# Patient Record
Sex: FEMALE | Race: WHITE
[De-identification: names, ages, dates, MRNs, and addresses within clinical notes are randomized per-mention and may not be internally consistent; named-entity substitution may affect disease eponyms.]

---

## 2018-02-13 ENCOUNTER — HOSPITAL ENCOUNTER (EMERGENCY)
Dept: HOSPITAL 80 - FED | Age: 28
Discharge: HOME | End: 2018-02-13
Payer: SELF-PAY

## 2018-02-13 VITALS
HEART RATE: 74 BPM | SYSTOLIC BLOOD PRESSURE: 122 MMHG | DIASTOLIC BLOOD PRESSURE: 69 MMHG | OXYGEN SATURATION: 98 % | RESPIRATION RATE: 18 BRPM | TEMPERATURE: 98.1 F

## 2018-02-13 DIAGNOSIS — R53.83: ICD-10-CM

## 2018-02-13 DIAGNOSIS — R42: Primary | ICD-10-CM

## 2018-02-13 NOTE — EDPHY
H & P


Time Seen by Provider: 02/13/18 16:03


Constitutional: 


 Initial Vital Signs











Temperature (C)  37.0 C   02/13/18 16:05


 


Heart Rate  72   02/13/18 16:05


 


Respiratory Rate  16   02/13/18 16:05


 


Blood Pressure  126/73 H  02/13/18 16:05


 


O2 Sat (%)  97   02/13/18 16:05








 











O2 Delivery Mode               Room Air














Allergies/Adverse Reactions: 


 





No Known Allergies Allergy (Unverified 02/13/18 16:04)


 








Home Medications: 














 Medication  Instructions  Recorded


 


NK [No Known Home Meds]  02/13/18














Medical Decision Making


ED Course/Re-evaluation: 





CHIEF COMPLAINT:  "I think I need some oxygen and fluid and nutrients and 

vitamins. I don't want any medicine." Dizziness, fatigue





HISTORY OF PRESENT ILLNESS:  


The patient is a 26 y/o female who is complaining of dizziness and fatigue 

[place] She attributes her 

symptoms to altitude sickness and is requesting oxygen. She is declining any 

"medicine treatments" and only wants oxygen and IV fluids from us. She notes 

she eats a "raw vegan" diet and has been eating more fruit and vegetables to 

treat her symptoms without improvement. She denies fever, myalgias, abdominal 

pain, vomiting, diarrhea, cough, rhinorrhea, headache, unilateral weakness or 

paresthesias, and does not think she is ill or could be ill. She declines any 

lab work or investigation of her symptoms. She reports she is normally healthy. 





REVIEW OF SYSTEMS:  





A 10 point review of systems was performed and is negative with the exception 

of the elements mentioned in the history of present illness.





PHYSICAL EXAM:  





HR, BP, O2 Sat, RR.  Temp noted


General Appearance:  Alert, well hydrated, appropriate, and non-toxic appearing.


Head:  Atraumatic without scalp tenderness or obvious injury


Eyes:  Pupils equal, round, reactive to light and accommodation, EOMI, no trauma

, no injection.


Nose:  Atraumatic, no rhinorrhea, clear.


Throat:  Mucus membranes moist.


Neck:  Supple


Respiratory:  No retractions, no distress, no wheezes, and no accessory muscle 

use.  Lungs are clear to auscultation bilaterally.


Cardiovascular:  Regular rate and rhythm, no murmurs, rubs, or gallops. Good 

capillary refill all extremities.


Gastrointestinal:  Abdomen is soft, nontender, non-distended, no masses, no 

rebound, no guarding, no peritoneal signs.


Musculoskeletal:  Normal active ROM of all extremities, atraumatic.


Neurological:  Alert, appropriate, and interactive.  The patient has non-focal 

cranial nerves, motor, sensory, and cerebellar exam.


Skin:  No rashes, good turgor, no nodules on palpation.





Past medical history: Denies


Past surgical history: Denies


Family history: Noncontributory


Social history: Eats "raw vegan" diet, [place].





DIFFERENTIAL DIAGNOSIS:   The differential diagnosis for the patient's symptoms 

included but was not limited to peripheral and central causes of vertigo, 

orthostatic causes including dehydration, cardiogenic and neurogenic causes, 

blood loss, pneumonia, urinary tract infection, viral syndrome, meningitis, and 

sepsis.





MEDICAL DECISION MAKING:  





This is a vegan 26 y/o female who presents with a 2-week history of fatigue, 

nausea, and dizziness [place] She is specifically 

requesting oxygen and IV fluids and adamantly declining any further 

investigation or treatment for her symptoms. She refused lab work, nebulizer. I'

ve ordered IV fluids and 





Departure





- Departure


Disposition: Home, Routine, Self-Care


Clinical Impression: 


 Dizziness





Fatigue


Qualifiers:


 Fatigue type: other Qualified Code(s): R53.83 - Other fatigue





Condition: Good


Instructions:  Fatigue (ED)


Additional Instructions: 


Follow up with your primary care provider for unimproved symptoms over the next 

week. Return to the ED for worsening of condition.


Report Scribed for: Willie Turner


Report Scribed by: Daniela Peterson


Date of Report: 02/13/18


Time of Report: 16:20

## 2018-02-17 ENCOUNTER — HOSPITAL ENCOUNTER (EMERGENCY)
Dept: HOSPITAL 80 - FED | Age: 28
Discharge: HOME | End: 2018-02-17
Payer: SELF-PAY

## 2018-02-17 VITALS — SYSTOLIC BLOOD PRESSURE: 112 MMHG | DIASTOLIC BLOOD PRESSURE: 72 MMHG | HEART RATE: 64 BPM | RESPIRATION RATE: 18 BRPM

## 2018-02-17 VITALS — TEMPERATURE: 98.2 F | OXYGEN SATURATION: 97 %

## 2018-02-17 DIAGNOSIS — R19.5: Primary | ICD-10-CM

## 2018-02-17 DIAGNOSIS — E86.9: ICD-10-CM

## 2018-02-17 LAB
INR PPP: 0.92 (ref 0.83–1.16)
PLATELET # BLD: 224 10^3/UL (ref 150–400)
PROTHROMBIN TIME: 12.6 SEC (ref 12–15)

## 2018-02-17 NOTE — EDPHY
HPI/HX/ROS/PE/MDM


Narrative: 





CHIEF COMPLAINT: Blood in stool 





HPI: 





This patient is a healthy 27 year old female complaining of blood in her stool. 

This began two days ago. Today, she had two bowel movements that were darker 

than usual with associated blood. She denies any pain with bowel movements, 

loose stools, or tarry stools. She has not been able to exercise as strenuously 

as usual and has felt fatigued and dehydrated but originally attributed this to 

altitude sickness as she recently moved to [place] from [place]. She was 

evaluated 2/13/18 for possible altitude sickness and requested and received IVF

, but denied any laboratory tests or medical interventions at that time. The 

patient reports she has been taking Mucuna pruriens, a herbal supplement to 

increase dopamine levels, and is concerned that too much of this is causing her 

symptoms. She additionally complains of frequent blurred vision, confusion, and 

nausea.  No fever, vomiting, urinary complaints, or other associated symptoms. 





REVIEW OF SYSTEMS:


Aside from elements discussed in the HPI, a comprehensive 10-point review of 

systems was reviewed and is negative.





PMH: Denies.





SOCIAL HISTORY: Moved to [place] from [place] three weeks ago. 





PHYSICAL EXAM:


General:Patient is alert, in no acute distress.


ENT:Eyes are normal to inspection.  ENT inspection normal.


Neck: Normal inspection.  Full range of motion.


Respiratory:No respiratory distress.  Breath sounds normal bilaterally.


Cardiovascular: Regular rate and rhythm.  Strong peripheral pulses.  Normal cap 

refill.


Abdomen:The abdomen is nontender to palpation. There are no peritoneal signs. 

There are normal bowel sounds.


Back: Normal to inspection.  No tenderness to palpation.


Skin: Normal color.  No rash.  Warm and dry.


Extremities: Normal appearance.  Full range of motion.


Neuro: Oriented x3.  Normal motor function.  Normal sensory function.





ED Course: 





28 y/o female presents with blood in her stool beginning two days ago. Exam 

unremarkable. Plan for labs including CBC, chemistries, feces occult blood test.





The patient takes Mucuna pruriens, a Dopamine herbal supplement. She takes 3-6 

pills per day, 250mg stem and 350mg of leaf per pill, and is concerned this may 

be causing symptoms. Internet search for Mucuna pruriens did not find any 

toxicology warnings. 





21:32 Pt unable to provide stool sample. Laboratory studies within normal limits

, and the patient continues to be well-appearing. I offered to have her stay 

until she can provide a sample, but the patient works as a  for [employer] and 

is anxious to leave and return to work. She is comfortable with discharge home 

at this time. Will discharge in good condition with GI referral. Return 

precautions discussed.  





- Data Points


Laboratory Results: 


 Laboratory Results





 02/17/18 20:20 





 02/17/18 20:20 





 











  02/17/18 02/17/18 02/17/18





  20:25 20:20 20:20


 


WBC      





    


 


RBC      





    


 


Hgb      





    


 


Hct      





    


 


MCV      





    


 


MCH      





    


 


MCHC      





    


 


RDW      





    


 


Plt Count      





    


 


MPV      





    


 


Neut % (Auto)      





    


 


Lymph % (Auto)      





    


 


Mono % (Auto)      





    


 


Eos % (Auto)      





    


 


Baso % (Auto)      





    


 


Nucleat RBC Rel Count      





    


 


Absolute Neuts (auto)      





    


 


Absolute Lymphs (auto)      





    


 


Absolute Monos (auto)      





    


 


Absolute Eos (auto)      





    


 


Absolute Basos (auto)      





    


 


Absolute Nucleated RBC      





    


 


Immature Gran %      





    


 


Immature Gran #      





    


 


PT  12.6 SEC SEC    





   (12.0-15.0)   


 


INR  0.92     





   (0.83-1.16)   


 


APTT  28.1 SEC SEC    





   (23.0-38.0)   


 


Sodium      141 mEq/L mEq/L





     (135-145) 


 


Potassium      4.6 mEq/L mEq/L





     (3.5-5.2) 


 


Chloride      105 mEq/L mEq/L





     () 


 


Carbon Dioxide      24 mEq/l mEq/l





     (22-31) 


 


Anion Gap      12 mEq/L mEq/L





     (8-16) 


 


BUN      20 mg/dL mg/dL





     (7-23) 


 


Creatinine      0.6 mg/dL mg/dL





     (0.6-1.0) 


 


Estimated GFR      > 60 





    


 


Glucose      90 mg/dL mg/dL





     () 


 


Calcium      9.8 mg/dL mg/dL





     (8.5-10.4) 


 


Beta HCG, Qual    NEGATIVE   





    














  02/17/18





  20:20


 


WBC  10.14 10^3/uL H 10^3/uL





   (3.80-9.50) 


 


RBC  4.08 10^6/uL L 10^6/uL





   (4.18-5.33) 


 


Hgb  12.4 g/dL L g/dL





   (12.6-16.3) 


 


Hct  37.3 % L %





   (38.0-47.0) 


 


MCV  91.4 fL fL





   (81.5-99.8) 


 


MCH  30.4 pg pg





   (27.9-34.1) 


 


MCHC  33.2 g/dL g/dL





   (32.4-36.7) 


 


RDW  13.1 % %





   (11.5-15.2) 


 


Plt Count  224 10^3/uL 10^3/uL





   (150-400) 


 


MPV  11.3 fL fL





   (8.7-11.7) 


 


Neut % (Auto)  60.3 % %





   (39.3-74.2) 


 


Lymph % (Auto)  26.0 % %





   (15.0-45.0) 


 


Mono % (Auto)  11.8 % %





   (4.5-13.0) 


 


Eos % (Auto)  1.0 % %





   (0.6-7.6) 


 


Baso % (Auto)  0.7 % %





   (0.3-1.7) 


 


Nucleat RBC Rel Count  0.0 % %





   (0.0-0.2) 


 


Absolute Neuts (auto)  6.11 10^3/uL 10^3/uL





   (1.70-6.50) 


 


Absolute Lymphs (auto)  2.64 10^3/uL 10^3/uL





   (1.00-3.00) 


 


Absolute Monos (auto)  1.20 10^3/uL H 10^3/uL





   (0.30-0.80) 


 


Absolute Eos (auto)  0.10 10^3/uL 10^3/uL





   (0.03-0.40) 


 


Absolute Basos (auto)  0.07 10^3/uL 10^3/uL





   (0.02-0.10) 


 


Absolute Nucleated RBC  0.00 10^3/uL 10^3/uL





   (0-0.01) 


 


Immature Gran %  0.2 % %





   (0.0-1.1) 


 


Immature Gran #  0.02 10^3/uL 10^3/uL





   (0.00-0.10) 


 


PT  





  


 


INR  





  


 


APTT  





  


 


Sodium  





  


 


Potassium  





  


 


Chloride  





  


 


Carbon Dioxide  





  


 


Anion Gap  





  


 


BUN  





  


 


Creatinine  





  


 


Estimated GFR  





  


 


Glucose  





  


 


Calcium  





  


 


Beta HCG, Qual  





  











Medications Given: 


 








Discontinued Medications





Sodium Chloride (Ns)  1,000 mls @ 0 mls/hr IV EDNOW ONE; Wide [facility]


   PRN Reason: Protocol


   Stop: 02/17/18 20:11


   Last Admin: 02/17/18 20:38 Dose:  1,000 mls








General


Time Seen by Provider: 02/17/18 20:02


Initial Vital Signs: 


 Initial Vital Signs











Temperature (C)  36.8 C   02/17/18 19:53


 


Heart Rate  68   02/17/18 19:53


 


Respiratory Rate  16   02/17/18 19:53


 


Blood Pressure  96/68 L  02/17/18 19:53


 


O2 Sat (%)  97   02/17/18 19:53








 











O2 Delivery Mode               Room Air














Allergies/Adverse Reactions: 


 





No Known Allergies Allergy (Unverified 02/13/18 16:04)


 








Home Medications: 














 Medication  Instructions  Recorded


 


NK [No Known Home Meds]  02/13/18














Departure





- Departure


Disposition: Home, Routine, Self-Care


Clinical Impression: 


 Blood in stool





Condition: Good


Instructions:  Gastrointestinal Bleeding (ED)


Additional Instructions: 


1. Follow up with a GI specialist next week for further evaluation. 





2. Return to the emergency department for dark tarry stools, bright red blood 

in your stool, blood in your vomit, weakness, fainting, or other worsening of 

condition. 


Referrals: 


Pan Lemus MD [Medical Doctor] - As per Instructions


Report Scribed for: Pan Wallace


Report Scribed by: Sara Estes


Date of Report: 02/17/18


Time of Report: 21:18


Physician Review and Approval Statement: Portions of this note were transcribed 

by an ED scribe.  I personally performed the history, physical exam, and 

medical decision making; and confirm the accuracy of the information in the 

transcribed note.